# Patient Record
(demographics unavailable — no encounter records)

---

## 2025-03-07 NOTE — HISTORY OF PRESENT ILLNESS
[FreeTextEntry1] :  Cardiac risk factors/ diagnoses:   -HTN -Diabetes  Mellitus -Hyperlipidemia -Tobacco Use -Family history premature or other atherosclerotic heart disease Basically well   Two years ago, first episode AF, spontaneously reverted to NSR. Echo then bicuspid aortic valve, stress test nl per pt report. metop only, [id number] 1, no DOAC   Past mos, progressive increase in frequency of PAF episodes up to 24 hrs, confirmed by daughter NP and [employer]. Also notes MCMANUS at lower workload, one episode CP one mo ago, no recurrence Denies orthopnea, PND  or edema.  NAILA screen + snoring + day time somnolence + non restorative sleep + witnessed apnea  recent rash - saw Derm - antibiotics prescribed

## 2025-03-07 NOTE — ASSESSMENT
[FreeTextEntry1] : EKG NSR nl EKG  A/P  1. PAF Option explained to pt and daughter that best approach now is RFA Pt very apprehensive re procedure Lengthy discussion w pt and daughter Cathi (NP at [facility]) re likelihood of progression to permanent possible tachycardia induced CM, and likely age related need for lifelong DOAC (< 2 years from now) if left as is. Explained meds not likely durable option to maintain NSR.  Explained that optimal procedural outcome for PAF vs persistent. OK to continue aspirin only for now due to low [id number]  NB likely will require CAD evaluation (MCMANUS) prior to consideration ofr ablation   2. bicuspid aortic valve 3. aortic stenosis, non rheumatic echo as noted  follow for now   4.     suspect NAILA In view of symptoms as listed in HPI, and associated medical and cardiac conditions as noted, HST is indicated to assess for NAILA. for now, complete NAILA w/u OK to continue aspirin only for now due to low [id number]